# Patient Record
Sex: MALE | URBAN - METROPOLITAN AREA
[De-identification: names, ages, dates, MRNs, and addresses within clinical notes are randomized per-mention and may not be internally consistent; named-entity substitution may affect disease eponyms.]

---

## 2020-03-30 ENCOUNTER — NURSE TRIAGE (OUTPATIENT)
Dept: OTHER | Facility: OTHER | Age: 40
End: 2020-03-30

## 2020-03-30 NOTE — TELEPHONE ENCOUNTER
Reason for Disposition   [1] Fever (or feeling feverish) OR cough occurs AND [2] travel from or living in high risk area (identified by [facility]) AND [3] within last 14 days    Answer Assessment - Initial Assessment Questions  1  CONFIRMED CASE: "Who is the person with the confirmed COVID-19 infection that you were exposed to?"      Pt is a Nancy    2  PLACE of CONTACT: "Where were you when you were exposed to COVID-19  (coronavirus disease 2019)?" (e g , city, state, country)      [place], [place]  3  TYPE of CONTACT: "How much contact was there?" (e g , live in same house, work in same office, same school)      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  4  DATE of CONTACT: "When did you have contact with a coronavirus patient?" (e g , days)      Worked last week Sunday through Friday  5  DURATION of CONTACT: "How long were you in contact with the COVID-19 (coronavirus disease) patient?" (e g , a few seconds, passed by person, a few minutes, live with the patient)      On days at the job various hours  6  SYMPTOMS: "Do you have any symptoms?" (e g , fever, cough, breathing difficulty)      100 8 to 101 5 orally  7  PREGNANCY OR POSTPARTUM: "Is there any chance you are pregnant?" "When was your last menstrual period?" "Did you deliver in the last 2 weeks?"      Not applicable  8  HIGH RISK: "Do you have any heart or lung problems?  Do you have a weakened immune system?" (e g , CHF, COPD, asthma, HIV positive, chemotherapy, renal failure, diabetes mellitus, sickle cell anemia)      No known health problems    Protocols used: CORONAVIRUS (COVID-19) EXPOSURE-ADULT-[place]

## 2020-03-30 NOTE — TELEPHONE ENCOUNTER
----- Message from Leon Alpers, MA sent at 3/29/2020  8:00 PM EDT -----  Pt is a [facility]  living in [place]  Fever started Sat morning ranging from 99 3-101  3  He has a cough, body aches  No sob  He has no pcp